# Patient Record
Sex: MALE | Race: WHITE | HISPANIC OR LATINO | URBAN - METROPOLITAN AREA
[De-identification: names, ages, dates, MRNs, and addresses within clinical notes are randomized per-mention and may not be internally consistent; named-entity substitution may affect disease eponyms.]

---
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
## 2022-08-12 ENCOUNTER — EMERGENCY (EMERGENCY)
Facility: HOSPITAL | Age: 22
LOS: 1 days | Discharge: HOME | End: 2022-08-12
Attending: STUDENT IN AN ORGANIZED HEALTH CARE EDUCATION/TRAINING PROGRAM
Payer: MEDICAID

## 2022-08-12 VITALS
RESPIRATION RATE: 18 BRPM | OXYGEN SATURATION: 98 % | HEIGHT: 67 IN | DIASTOLIC BLOOD PRESSURE: 67 MMHG | WEIGHT: 205.69 LBS | HEART RATE: 64 BPM | TEMPERATURE: 98 F | SYSTOLIC BLOOD PRESSURE: 103 MMHG

## 2022-08-12 LAB
B PERT DNA SPEC QL NAA+PROBE: SIGNIFICANT CHANGE UP
C PNEUM DNA SPEC QL NAA+PROBE: SIGNIFICANT CHANGE UP
FLUAV H1 2009 PAND RNA SPEC QL NAA+PROBE: SIGNIFICANT CHANGE UP
FLUAV H1 RNA SPEC QL NAA+PROBE: SIGNIFICANT CHANGE UP
FLUAV H3 RNA SPEC QL NAA+PROBE: SIGNIFICANT CHANGE UP
FLUAV SUBTYP SPEC NAA+PROBE: SIGNIFICANT CHANGE UP
FLUBV RNA SPEC QL NAA+PROBE: SIGNIFICANT CHANGE UP
HADV DNA SPEC QL NAA+PROBE: SIGNIFICANT CHANGE UP
HCOV PNL SPEC NAA+PROBE: SIGNIFICANT CHANGE UP
HMPV RNA SPEC QL NAA+PROBE: SIGNIFICANT CHANGE UP
HPIV1 RNA SPEC QL NAA+PROBE: SIGNIFICANT CHANGE UP
HPIV2 RNA SPEC QL NAA+PROBE: SIGNIFICANT CHANGE UP
HPIV3 RNA SPEC QL NAA+PROBE: SIGNIFICANT CHANGE UP
HPIV4 RNA SPEC QL NAA+PROBE: SIGNIFICANT CHANGE UP
RAPID RVP RESULT: DETECTED
RV+EV RNA SPEC QL NAA+PROBE: SIGNIFICANT CHANGE UP
S PYO DNA THROAT QL NAA+PROBE: SIGNIFICANT CHANGE UP
SARS-COV-2 RNA SPEC QL NAA+PROBE: DETECTED

## 2022-08-12 PROCEDURE — 87798 DETECT AGENT NOS DNA AMP: CPT

## 2022-08-12 PROCEDURE — 0225U NFCT DS DNA&RNA 21 SARSCOV2: CPT

## 2022-08-12 PROCEDURE — 99283 EMERGENCY DEPT VISIT LOW MDM: CPT

## 2022-08-12 PROCEDURE — 87651 STREP A DNA AMP PROBE: CPT

## 2022-08-12 PROCEDURE — 99284 EMERGENCY DEPT VISIT MOD MDM: CPT

## 2022-08-12 NOTE — ED PROVIDER NOTE - ATTENDING APP SHARED VISIT CONTRIBUTION OF CARE
This was a shared visit with DAVID. I reviewed and verified the documentation and independently performed the documented history, exam, and MDM.    22yoM PMH ADHD/autism presents with sore throat, fever, headache x 2 days. No cough, n/v/d, abdominal pain. No chest pain or SOB. No neck stiffness. No extremity weakness or numbness.     On exam, initial vitals were reviewed.  Pt is well-appearing in no acute distress. NC/AT, clear eyes, MMM, no oropharyngeal erythema, swelling, or exudates, supple neck with full ROM, RRR, clear breath sounds bilaterally, abdomen soft, nontender, nondistended, no obvious joint deformities, pt is awake and alert and moving all extremities without difficulty, no rash noted.     Likely viral vs strep. Pt's and pt's mother did not want to wait for results. Discussed symptomatic management, return precautions, and instructions for outpt f/u with PCP.

## 2022-08-12 NOTE — ED PROVIDER NOTE - PATIENT PORTAL LINK FT
You can access the FollowMyHealth Patient Portal offered by [facility] by registering at the following website: [email or web address]. By joining [facility]’s FollowMyHealth portal, you will also be able to view your health information using other applications (apps) compatible with our system.

## 2022-08-12 NOTE — ED PROVIDER NOTE - CLINICAL SUMMARY MEDICAL DECISION MAKING FREE TEXT BOX
22 yr old M presented to ED for sore throat and fever , headache x 2 days. Pt explained that he does not have a headache at this time. Pt states that he have no SOB, difficulty breathing or chest pain. Examination + throat redness and rest of examination is within acceptable limits.

## 2022-08-12 NOTE — ED ADULT NURSE NOTE - OBJECTIVE STATEMENT
Pt comes in complaining of sore throat x 2 days. Pt denies recent COVID exposure. Pt states his phlem his been light green in color since his sore throat started.

## 2022-08-12 NOTE — ED PROVIDER NOTE - OBJECTIVE STATEMENT
22 yr old M presented to ED for sore throat and fever , headache x 2 days. Pt explained that he does not have a headache at this time. Pt states that he have no SOB, difficulty breathing or chest pain. Pt's Mother states that Pt is has a Hx of ADHD with appropriate mediation. Mother denies any other issues at this time. Pt admits to pain with eating and drinking but no difficulty swallowing.

## 2022-08-12 NOTE — ED PROVIDER NOTE - PROGRESS NOTE DETAILS
Pt treated for pain and Strep and RVP swabbed collected. pt D/C and will continue with OTC medication as discussed.

## 2022-11-18 NOTE — ED PROVIDER NOTE - CROS ED CARDIOVAS ALL NEG
11/18/2022    Patient: Lili Clarke   YOB: 2009   Date of Visit: 11/18/2022     Franky Wheeler MD  [id number]  Via Fax: [phone]    Dear Franky Wheeler MD,      Thank you for referring Mr. iLli Clarke to [facility]. for evaluation. My notes for this consultation are attached. If you have questions, please do not hesitate to call me. I look forward to following your patient along with you.       Sincerely,    Antonio Mckeon, DO
negative...